# Patient Record
Sex: MALE | Race: WHITE
[De-identification: names, ages, dates, MRNs, and addresses within clinical notes are randomized per-mention and may not be internally consistent; named-entity substitution may affect disease eponyms.]

---

## 2017-01-16 ENCOUNTER — HOSPITAL ENCOUNTER (OUTPATIENT)
Dept: HOSPITAL 53 - M SLEEP | Age: 47
End: 2017-01-16
Attending: NURSE PRACTITIONER
Payer: COMMERCIAL

## 2017-01-16 DIAGNOSIS — G47.30: Primary | ICD-10-CM

## 2017-01-20 NOTE — SLEEPHOME
DATE OF PROCEDURE:  01/16/2017

 

ORDERED BY:  Janey Alanis NP

 

Diagnostic home sleep testing was performed due to concern for the obstructive

sleep apnea syndrome.

 

For testing, a NOX-T3 respiratory monitoring device was used.  Continuous record

was made of pulse, oxygen saturation, airflow, chest and abdominal strain, and

body position.

 

9 hours and 59 minutes of data were reviewed.  Of these, 8 hours and 2 minutes

were marked as time in bed.  During the interval marked time in bed, 444

respiratory events were identified of 10 seconds in duration or greater for a

respiratory event index of 55.2.  The events were primarily obstructive, but

mixed and central apneas were also seen.  Baseline heart rate 77 beats per

minute. Pulse rate ranged as high as 116.  The patient's baseline oxygen

saturation was borderline at 89%.  Lowest oxygen saturation recorded 54%.

Testing was performed in both the supine and non-supine positions.

 

IMPRESSION:  Abnormal home sleep testing with repetitive respiratory events and

oxygen desaturations to 54% with a respiratory event index of 55.2 is consistent

with the obstructive sleep apnea syndrome.  Given the significant oxygen

desaturations and the occurrence of central and mixed apneas, referral for a

formal sleep evaluation is recommended with in-laboratory pressure titration.

## 2017-01-28 ENCOUNTER — HOSPITAL ENCOUNTER (OUTPATIENT)
Dept: HOSPITAL 53 - M LAB REF | Age: 47
End: 2017-01-28
Attending: PHYSICIAN ASSISTANT
Payer: COMMERCIAL

## 2017-01-28 DIAGNOSIS — J03.90: Primary | ICD-10-CM

## 2017-03-01 ENCOUNTER — HOSPITAL ENCOUNTER (OUTPATIENT)
Dept: HOSPITAL 53 - M SLEEP | Age: 47
End: 2017-03-01
Attending: NURSE PRACTITIONER
Payer: COMMERCIAL

## 2017-03-01 DIAGNOSIS — G47.33: ICD-10-CM

## 2017-03-01 DIAGNOSIS — G47.31: Primary | ICD-10-CM

## 2017-03-03 NOTE — SLEEPCENT
DATE OF PROCEDURE:  03/01/2017

 

REFERRING PHYSICIAN: Janey Alanis

 

Nocturnal polysomnography was performed for the titration of pressure therapy in

this patient with obstructive sleep apnea syndrome on clinical grounds confirmed

by home sleep testing revealing a respiratory event index of 55.

 

For testing, the patient was fit with a ResMed Mirage Quattro full face mask, 4

cm of water pressure were initially applied to the circuit and the lights were

extinguished.

 

8 hours and 7 minutes of data were reviewed. There were 426 minutes of sleep

identified. Sleep latency was normal at 12 minutes. Rapid eye movement (REM)

sleep was short at 58 minutes. Sleep architecture was good with REM rebound and

four REM periods appeciated. Overall sleep efficiency was 89%. The patient's EKG

showed a sinus rhythm with an average heart rate of 68 beats per minute. EEG

showed reasonably normal waveforms for awake and sleep. No focal events were

identified. Persistence of respiratory events prompted and increase in CPAP from

4 to 12. Despite optimal mask fit and minimal air leak, the patient continued to

experience obstructive events and was changed to a bilevel device. Best sleep was

seen on a bilevel therapy inspiratory pressure of 19 over expiratory pressure of

14. There were occasional central events and occasional hypnic events also seen,

but oxygen saturations had improved. There was minimal limb activity appreciated

and with pressure therapy oxygen saturations improved.

 

IMPRESSION:

Severe complex obstructive sleep apnea syndrome (G47.31, G47.33).

 

RECOMMENDATION:

Initiation of pressure therapy using a bilevel device with an inspiratory

pressure of 19 and expiratory pressure of 14 is recommended. Given the complexity

of the patient's disease, close clinical monitoring is recommended and repeat

titrations may be necessary.

## 2017-07-05 ENCOUNTER — HOSPITAL ENCOUNTER (OUTPATIENT)
Dept: HOSPITAL 53 - M SLEEP | Age: 47
End: 2017-07-05
Attending: NURSE PRACTITIONER
Payer: COMMERCIAL

## 2017-07-05 DIAGNOSIS — G47.33: Primary | ICD-10-CM

## 2017-07-07 NOTE — SLEEPCENT
DATE OF PROCEDURE:   07/05/2017

 

REQUESTING PROVIDER:  Janey Alanis NP

 

INTERPRETATION: Nocturnal polysomnography was performed for retitration of

pressure therapy in this patient on bilevel treatment.

 

For testing, a ResMed Quattro full face mask of medium size was used.  Initial

pressure of 19 inspiratory/14 expiratory was applied to the circuit and the

lights were extinguished.

 

7 hours and 51 minutes of data were reviewed.  There were 242 minutes of sleep

identified.  Sleep latency was mildly prolonged at 42 minutes.  Rapid eye

movement (REM) latency was short at 22 minutes. Sleep architecture was fair, but

there was a prolonged period of wake between 11:30 and 2:00 a.m.  Once

reestablished, sleep progression was good.  Overall sleep efficiency was

decreased to 52.3% because of this.  The patient's EKG showed a sinus rhythm with

an average heart rate of 74 beats per minute.  Electroencephalogram (EEG) showed

some coarsening of background, possibly alpha intrusion.  Respiratory events were

fully palliated with a bilevel therapy inspiratory 18/expiratory 14.  There was

little limb activity appreciated.  Remaining measures of sleep physiology were

normal.

 

IMPRESSION:

1.  Obstructive sleep apnea syndrome (G47.33).

 

RECOMMENDATIONS:   Nightly use of pressure therapy with a bilevel device of

inspiratory pressure of 18/expiratory pressure of 14.

## 2017-10-01 ENCOUNTER — HOSPITAL ENCOUNTER (EMERGENCY)
Dept: HOSPITAL 53 - M ED | Age: 47
Discharge: HOME | End: 2017-10-01
Payer: MEDICAID

## 2017-10-01 VITALS — DIASTOLIC BLOOD PRESSURE: 82 MMHG | SYSTOLIC BLOOD PRESSURE: 151 MMHG

## 2017-10-01 VITALS — WEIGHT: 300.62 LBS | HEIGHT: 72 IN | BODY MASS INDEX: 40.72 KG/M2

## 2017-10-01 DIAGNOSIS — L03.317: Primary | ICD-10-CM

## 2017-10-08 ENCOUNTER — HOSPITAL ENCOUNTER (EMERGENCY)
Dept: HOSPITAL 53 - M ED | Age: 47
Discharge: HOME | End: 2017-10-08
Payer: MEDICAID

## 2017-10-08 VITALS — HEIGHT: 72 IN | BODY MASS INDEX: 40.72 KG/M2 | WEIGHT: 300.62 LBS

## 2017-10-08 VITALS — DIASTOLIC BLOOD PRESSURE: 92 MMHG | SYSTOLIC BLOOD PRESSURE: 185 MMHG

## 2017-10-08 DIAGNOSIS — L02.31: Primary | ICD-10-CM

## 2017-10-08 DIAGNOSIS — F17.200: ICD-10-CM

## 2017-10-10 ENCOUNTER — HOSPITAL ENCOUNTER (EMERGENCY)
Dept: HOSPITAL 53 - M ED | Age: 47
LOS: 1 days | Discharge: HOME | End: 2017-10-11
Payer: MEDICAID

## 2017-10-10 VITALS — HEIGHT: 72 IN | BODY MASS INDEX: 42.4 KG/M2 | WEIGHT: 313.06 LBS

## 2017-10-10 DIAGNOSIS — L02.31: ICD-10-CM

## 2017-10-10 DIAGNOSIS — Z48.00: Primary | ICD-10-CM

## 2017-10-10 DIAGNOSIS — F17.200: ICD-10-CM

## 2017-10-11 VITALS — SYSTOLIC BLOOD PRESSURE: 141 MMHG | DIASTOLIC BLOOD PRESSURE: 90 MMHG

## 2022-05-16 ENCOUNTER — HOSPITAL ENCOUNTER (OUTPATIENT)
Dept: HOSPITAL 53 - M RAD | Age: 52
End: 2022-05-16
Payer: COMMERCIAL

## 2022-05-16 DIAGNOSIS — R91.8: Primary | ICD-10-CM

## 2022-06-14 ENCOUNTER — HOSPITAL ENCOUNTER (OUTPATIENT)
Dept: HOSPITAL 53 - M LAB REF | Age: 52
End: 2022-06-14
Attending: INTERNAL MEDICINE
Payer: COMMERCIAL

## 2022-06-14 ENCOUNTER — HOSPITAL ENCOUNTER (OUTPATIENT)
Dept: HOSPITAL 53 - M LAB | Age: 52
End: 2022-06-14
Attending: INTERNAL MEDICINE
Payer: COMMERCIAL

## 2022-06-14 DIAGNOSIS — R06.00: ICD-10-CM

## 2022-06-14 DIAGNOSIS — I10: Primary | ICD-10-CM

## 2022-06-14 LAB
BUN SERPL-MCNC: 16 MG/DL (ref 7–18)
CALCIUM SERPL-MCNC: 8.7 MG/DL (ref 8.5–10.1)
CHLORIDE SERPL-SCNC: 106 MEQ/L (ref 98–107)
CO2 SERPL-SCNC: 32 MEQ/L (ref 21–32)
CREAT SERPL-MCNC: 0.92 MG/DL (ref 0.7–1.3)
CREAT UR-MCNC: 153 MG/DL
CREAT UR-MCNC: 153 MG/DL
GFR SERPL CREATININE-BSD FRML MDRD: > 60 ML/MIN/{1.73_M2} (ref 56–?)
GLUCOSE SERPL-MCNC: 97 MG/DL (ref 70–100)
MICROALBUMIN UR-MCNC: 21.1 MG/L
MICROALBUMIN/CREAT UR: 13.7 MCG/MG (ref 0–30)
NT-PRO BNP: 149 PG/ML (ref ?–125)
POTASSIUM SERPL-SCNC: 4 MEQ/L (ref 3.5–5.1)
SODIUM SERPL-SCNC: 140 MEQ/L (ref 136–145)

## 2022-06-20 ENCOUNTER — HOSPITAL ENCOUNTER (OUTPATIENT)
Dept: HOSPITAL 53 - M CARPUL | Age: 52
End: 2022-06-20
Attending: INTERNAL MEDICINE
Payer: COMMERCIAL

## 2022-06-20 DIAGNOSIS — R60.0: Primary | ICD-10-CM

## 2022-06-27 ENCOUNTER — HOSPITAL ENCOUNTER (OUTPATIENT)
Dept: HOSPITAL 53 - M LAB | Age: 52
End: 2022-06-27
Attending: INTERNAL MEDICINE
Payer: COMMERCIAL

## 2022-06-27 DIAGNOSIS — E27.0: Primary | ICD-10-CM

## 2022-06-30 ENCOUNTER — HOSPITAL ENCOUNTER (OUTPATIENT)
Dept: HOSPITAL 53 - M LAB | Age: 52
End: 2022-06-30
Attending: INTERNAL MEDICINE
Payer: COMMERCIAL

## 2022-06-30 DIAGNOSIS — R60.0: Primary | ICD-10-CM

## 2022-06-30 LAB
ALBUMIN SERPL BCG-MCNC: 3.8 GM/DL (ref 3.2–5.2)
BUN SERPL-MCNC: 17 MG/DL (ref 7–18)
CALCIUM SERPL-MCNC: 8.9 MG/DL (ref 8.5–10.1)
CHLORIDE SERPL-SCNC: 106 MEQ/L (ref 98–107)
CO2 SERPL-SCNC: 31 MEQ/L (ref 21–32)
CREAT SERPL-MCNC: 1.13 MG/DL (ref 0.7–1.3)
GFR SERPL CREATININE-BSD FRML MDRD: > 60 ML/MIN/{1.73_M2} (ref 56–?)
GLUCOSE SERPL-MCNC: 111 MG/DL (ref 70–100)
PHOSPHATE SERPL-MCNC: 2.8 MG/DL (ref 2.5–4.9)
POTASSIUM SERPL-SCNC: 4.3 MEQ/L (ref 3.5–5.1)
SODIUM SERPL-SCNC: 141 MEQ/L (ref 136–145)

## 2024-11-08 ENCOUNTER — HOSPITAL ENCOUNTER (OUTPATIENT)
Dept: HOSPITAL 53 - M LAB | Age: 54
End: 2024-11-08
Attending: NURSE PRACTITIONER
Payer: COMMERCIAL

## 2024-11-08 DIAGNOSIS — I50.32: Primary | ICD-10-CM

## 2024-11-08 DIAGNOSIS — Z13.220: ICD-10-CM

## 2024-11-08 LAB
ALBUMIN SERPL BCG-MCNC: 3.6 G/DL (ref 3.2–5.2)
ALP SERPL-CCNC: 80 U/L (ref 40–129)
ALT SERPL W P-5'-P-CCNC: 28 U/L (ref 7–40)
AST SERPL-CCNC: 12 U/L (ref ?–34)
BASOPHILS # BLD AUTO: 0.1 10^3/UL (ref 0–0.2)
BASOPHILS NFR BLD AUTO: 0.6 % (ref 0–1)
BILIRUB SERPL-MCNC: 0.6 MG/DL (ref 0.3–1.2)
BUN SERPL-MCNC: 14 MG/DL (ref 9–23)
CALCIUM SERPL-MCNC: 9.4 MG/DL (ref 8.5–10.1)
CHLORIDE SERPL-SCNC: 104 MMOL/L (ref 98–107)
CHOLEST SERPL-MCNC: 152 MG/DL (ref ?–200)
CHOLEST/HDLC SERPL: 4.34 {RATIO} (ref ?–5)
CO2 SERPL-SCNC: 33 MMOL/L (ref 20–31)
CREAT SERPL-MCNC: 0.88 MG/DL (ref 0.7–1.3)
EOSINOPHIL # BLD AUTO: 0.1 10^3/UL (ref 0–0.5)
EOSINOPHIL NFR BLD AUTO: 1.3 % (ref 0–3)
GFR SERPL CREATININE-BSD FRML MDRD: > 60 ML/MIN/{1.73_M2} (ref 56–?)
GLUCOSE SERPL-MCNC: 161 MG/DL (ref 60–100)
HCT VFR BLD AUTO: 48.4 % (ref 42–52)
HDLC SERPL-MCNC: 35 MG/DL (ref 40–?)
HGB BLD-MCNC: 15.8 G/DL (ref 13.5–17.5)
LDLC SERPL CALC-MCNC: 103.2 MG/DL (ref ?–100)
LYMPHOCYTES # BLD AUTO: 2.2 10^3/UL (ref 1.5–5)
LYMPHOCYTES NFR BLD AUTO: 23.7 % (ref 24–44)
MAGNESIUM SERPL-MCNC: 2.1 MG/DL (ref 1.8–2.4)
MCH RBC QN AUTO: 31 PG (ref 27–33)
MCHC RBC AUTO-ENTMCNC: 32.6 G/DL (ref 32–36.5)
MCV RBC AUTO: 95.1 FL (ref 80–96)
MONOCYTES # BLD AUTO: 0.7 10^3/UL (ref 0–0.8)
MONOCYTES NFR BLD AUTO: 7.9 % (ref 2–8)
NEUTROPHILS # BLD AUTO: 6 10^3/UL (ref 1.5–8.5)
NEUTROPHILS NFR BLD AUTO: 65.9 % (ref 36–66)
NONHDLC SERPL-MCNC: 117 MG/DL
PLATELET # BLD AUTO: 199 10^3/UL (ref 150–450)
POTASSIUM SERPL-SCNC: 4.7 MMOL/L (ref 3.5–5.1)
PROT SERPL-MCNC: 7 G/DL (ref 5.7–8.2)
RBC # BLD AUTO: 5.09 10^6/UL (ref 4.3–6.1)
SODIUM SERPL-SCNC: 140 MMOL/L (ref 136–145)
TRIGL SERPL-MCNC: 69 MG/DL (ref ?–150)
WBC # BLD AUTO: 9.1 10^3/UL (ref 4–10)

## 2025-01-23 ENCOUNTER — HOSPITAL ENCOUNTER (EMERGENCY)
Dept: HOSPITAL 53 - M ED | Age: 55
Discharge: HOME | End: 2025-01-23
Payer: COMMERCIAL

## 2025-01-23 VITALS — HEIGHT: 72 IN | BODY MASS INDEX: 42.66 KG/M2 | WEIGHT: 315 LBS

## 2025-01-23 VITALS — TEMPERATURE: 97 F | OXYGEN SATURATION: 98 % | SYSTOLIC BLOOD PRESSURE: 124 MMHG | DIASTOLIC BLOOD PRESSURE: 77 MMHG

## 2025-01-23 DIAGNOSIS — E11.65: Primary | ICD-10-CM

## 2025-01-23 DIAGNOSIS — Z79.899: ICD-10-CM

## 2025-01-23 DIAGNOSIS — I10: ICD-10-CM

## 2025-01-23 DIAGNOSIS — E78.5: ICD-10-CM

## 2025-01-23 DIAGNOSIS — Z79.811: ICD-10-CM

## 2025-01-23 DIAGNOSIS — Z76.0: ICD-10-CM

## 2025-01-23 DIAGNOSIS — J01.90: ICD-10-CM

## 2025-01-23 DIAGNOSIS — Z79.4: ICD-10-CM

## 2025-01-23 DIAGNOSIS — Z86.79: ICD-10-CM

## 2025-01-23 LAB
BASOPHILS # BLD AUTO: 0.1 10^3/UL (ref 0–0.2)
BASOPHILS NFR BLD AUTO: 0.6 % (ref 0–1)
BUN SERPL-MCNC: 16 MG/DL (ref 9–23)
CALCIUM SERPL-MCNC: 8.6 MG/DL (ref 8.5–10.1)
CHLORIDE SERPL-SCNC: 101 MMOL/L (ref 98–107)
CO2 SERPL-SCNC: 30 MMOL/L (ref 20–31)
CREAT SERPL-MCNC: 0.74 MG/DL (ref 0.7–1.3)
EOSINOPHIL # BLD AUTO: 0.1 10^3/UL (ref 0–0.5)
EOSINOPHIL NFR BLD AUTO: 1.5 % (ref 0–3)
GFR SERPL CREATININE-BSD FRML MDRD: > 60 ML/MIN/{1.73_M2} (ref 56–?)
GLUCOSE SERPL-MCNC: 266 MG/DL (ref 60–100)
HCT VFR BLD AUTO: 44.6 % (ref 42–52)
HGB BLD-MCNC: 15.1 G/DL (ref 13.5–17.5)
LYMPHOCYTES # BLD AUTO: 2.6 10^3/UL (ref 1.5–5)
LYMPHOCYTES NFR BLD AUTO: 27 % (ref 24–44)
MCH RBC QN AUTO: 31.6 PG (ref 27–33)
MCHC RBC AUTO-ENTMCNC: 33.9 G/DL (ref 32–36.5)
MCV RBC AUTO: 93.3 FL (ref 80–96)
MONOCYTES # BLD AUTO: 0.8 10^3/UL (ref 0–0.8)
MONOCYTES NFR BLD AUTO: 8 % (ref 2–8)
NEUTROPHILS # BLD AUTO: 6 10^3/UL (ref 1.5–8.5)
NEUTROPHILS NFR BLD AUTO: 62.3 % (ref 36–66)
PLATELET # BLD AUTO: 199 10^3/UL (ref 150–450)
POTASSIUM SERPL-SCNC: 4.6 MMOL/L (ref 3.5–5.1)
RBC # BLD AUTO: 4.78 10^6/UL (ref 4.3–6.1)
SODIUM SERPL-SCNC: 136 MMOL/L (ref 136–145)
WBC # BLD AUTO: 9.6 10^3/UL (ref 4–10)

## 2025-03-14 ENCOUNTER — HOSPITAL ENCOUNTER (EMERGENCY)
Dept: HOSPITAL 53 - M ED | Age: 55
Discharge: HOME | End: 2025-03-14
Payer: COMMERCIAL

## 2025-03-14 VITALS — DIASTOLIC BLOOD PRESSURE: 76 MMHG | SYSTOLIC BLOOD PRESSURE: 132 MMHG | TEMPERATURE: 97.6 F | OXYGEN SATURATION: 96 %

## 2025-03-14 VITALS — BODY MASS INDEX: 42.66 KG/M2 | HEIGHT: 72 IN | WEIGHT: 315 LBS

## 2025-03-14 DIAGNOSIS — S92.152A: ICD-10-CM

## 2025-03-14 DIAGNOSIS — Z79.899: ICD-10-CM

## 2025-03-14 DIAGNOSIS — E11.9: ICD-10-CM

## 2025-03-14 DIAGNOSIS — Z79.4: ICD-10-CM

## 2025-03-14 DIAGNOSIS — I10: ICD-10-CM
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
## 2025-03-14 DIAGNOSIS — M77.32: ICD-10-CM

## 2025-03-14 DIAGNOSIS — F17.200: ICD-10-CM

## 2025-03-14 DIAGNOSIS — S93.602A: Primary | ICD-10-CM
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
## 2025-04-04 ENCOUNTER — HOSPITAL ENCOUNTER (OUTPATIENT)
Dept: HOSPITAL 53 - M LAB | Age: 55
End: 2025-04-04
Payer: COMMERCIAL

## 2025-04-04 DIAGNOSIS — R53.83: ICD-10-CM

## 2025-04-04 DIAGNOSIS — Z11.9: ICD-10-CM

## 2025-04-04 DIAGNOSIS — E66.813: Primary | ICD-10-CM

## 2025-04-04 LAB
ALBUMIN SERPL BCG-MCNC: 3.6 G/DL (ref 3.2–5.2)
ALP SERPL-CCNC: 75 U/L (ref 40–129)
ALT SERPL W P-5'-P-CCNC: 35 U/L (ref 7–40)
AST SERPL-CCNC: 20 U/L (ref ?–34)
BASOPHILS # BLD AUTO: 0.1 10^3/UL (ref 0–0.2)
BASOPHILS NFR BLD AUTO: 0.5 % (ref 0–1)
BILIRUB SERPL-MCNC: 0.4 MG/DL (ref 0.3–1.2)
BUN SERPL-MCNC: 14 MG/DL (ref 9–23)
CALCIUM SERPL-MCNC: 8.9 MG/DL (ref 8.5–10.1)
CHLORIDE SERPL-SCNC: 107 MMOL/L (ref 98–107)
CHOLEST SERPL-MCNC: 148 MG/DL (ref ?–200)
CHOLEST/HDLC SERPL: 4.74 {RATIO} (ref ?–5)
CO2 SERPL-SCNC: 26 MMOL/L (ref 20–31)
CREAT SERPL-MCNC: 0.79 MG/DL (ref 0.7–1.3)
EOSINOPHIL # BLD AUTO: 0.1 10^3/UL (ref 0–0.5)
EOSINOPHIL NFR BLD AUTO: 1.3 % (ref 0–3)
GFR SERPL CREATININE-BSD FRML MDRD: > 60 ML/MIN/{1.73_M2} (ref 56–?)
GLUCOSE SERPL-MCNC: 111 MG/DL (ref 60–100)
HCT VFR BLD AUTO: 47.3 % (ref 42–52)
HDLC SERPL-MCNC: 31.2 MG/DL (ref 40–?)
HGB BLD-MCNC: 15.6 G/DL (ref 13.5–17.5)
LDLC SERPL CALC-MCNC: 84.8 MG/DL (ref ?–100)
LYMPHOCYTES # BLD AUTO: 2.6 10^3/UL (ref 1.5–5)
MCH RBC QN AUTO: 31.5 PG (ref 27–33)
MCV RBC AUTO: 95.4 FL (ref 80–96)
MONOCYTES # BLD AUTO: 0.8 10^3/UL (ref 0–0.8)
MONOCYTES NFR BLD AUTO: 8.6 % (ref 2–8)
NEUTROPHILS # BLD AUTO: 5.6 10^3/UL (ref 1.5–8.5)
NEUTROPHILS NFR BLD AUTO: 61.2 % (ref 36–66)
NONHDLC SERPL-MCNC: 116.8 MG/DL
PLATELET # BLD AUTO: 218 10^3/UL (ref 150–450)
POTASSIUM SERPL-SCNC: 4.9 MMOL/L (ref 3.5–5.1)
PROT SERPL-MCNC: 6.8 G/DL (ref 5.7–8.2)
RBC # BLD AUTO: 4.96 10^6/UL (ref 4.3–6.1)
SODIUM SERPL-SCNC: 140 MMOL/L (ref 136–145)
TRIGL SERPL-MCNC: 160 MG/DL (ref ?–150)
TSH SERPL DL<=0.005 MIU/L-ACNC: 1.15 UIU/ML (ref 0.55–4.78)
WBC # BLD AUTO: 9.1 10^3/UL (ref 4–10)

## 2025-04-14 ENCOUNTER — HOSPITAL ENCOUNTER (OUTPATIENT)
Dept: HOSPITAL 53 - M LAB | Age: 55
End: 2025-04-14
Attending: NURSE PRACTITIONER
Payer: COMMERCIAL

## 2025-04-14 DIAGNOSIS — I50.32: Primary | ICD-10-CM

## 2025-04-14 LAB
ALBUMIN SERPL BCG-MCNC: 3.4 G/DL (ref 3.2–5.2)
ALP SERPL-CCNC: 70 U/L (ref 40–129)
ALT SERPL W P-5'-P-CCNC: 28 U/L (ref 7–40)
AST SERPL-CCNC: 10 U/L (ref ?–34)
BASOPHILS NFR BLD AUTO: 0.5 % (ref 0–1)
BILIRUB SERPL-MCNC: 0.4 MG/DL (ref 0.3–1.2)
BUN SERPL-MCNC: 12 MG/DL (ref 9–23)
CALCIUM SERPL-MCNC: 8.4 MG/DL (ref 8.5–10.1)
CHLORIDE SERPL-SCNC: 102 MMOL/L (ref 98–107)
CO2 SERPL-SCNC: 31 MMOL/L (ref 20–31)
CREAT SERPL-MCNC: 0.88 MG/DL (ref 0.7–1.3)
EOSINOPHIL # BLD AUTO: 0.1 10^3/UL (ref 0–0.5)
EOSINOPHIL NFR BLD AUTO: 1.2 % (ref 0–3)
GFR SERPL CREATININE-BSD FRML MDRD: > 90 ML/MIN/{1.73_M2} (ref 56–?)
GLUCOSE SERPL-MCNC: 156 MG/DL (ref 60–100)
HCT VFR BLD AUTO: 44.9 % (ref 42–52)
HGB BLD-MCNC: 14.6 G/DL (ref 13.5–17.5)
LYMPHOCYTES # BLD AUTO: 1.9 10^3/UL (ref 1.5–5)
LYMPHOCYTES NFR BLD AUTO: 25.1 % (ref 24–44)
MCH RBC QN AUTO: 31.4 PG (ref 27–33)
MCHC RBC AUTO-ENTMCNC: 32.5 G/DL (ref 32–36.5)
MCV RBC AUTO: 96.6 FL (ref 80–96)
MONOCYTES # BLD AUTO: 0.8 10^3/UL (ref 0–0.8)
MONOCYTES NFR BLD AUTO: 10.5 % (ref 2–8)
NEUTROPHILS # BLD AUTO: 4.8 10^3/UL (ref 1.5–8.5)
NEUTROPHILS NFR BLD AUTO: 62.2 % (ref 36–66)
PLATELET # BLD AUTO: 188 10^3/UL (ref 150–450)
POTASSIUM SERPL-SCNC: 4.4 MMOL/L (ref 3.5–5.1)
PROT SERPL-MCNC: 6.3 G/DL (ref 5.7–8.2)
RBC # BLD AUTO: 4.65 10^6/UL (ref 4.3–6.1)
SODIUM SERPL-SCNC: 142 MMOL/L (ref 136–145)
WBC # BLD AUTO: 7.7 10^3/UL (ref 4–10)

## 2025-05-21 ENCOUNTER — HOSPITAL ENCOUNTER (OUTPATIENT)
Dept: HOSPITAL 53 - M PLAIMG | Age: 55
End: 2025-05-21
Attending: NURSE PRACTITIONER
Payer: COMMERCIAL

## 2025-05-21 DIAGNOSIS — I50.32: Primary | ICD-10-CM

## 2025-05-21 DIAGNOSIS — I77.810: ICD-10-CM
